# Patient Record
Sex: MALE | Race: WHITE | NOT HISPANIC OR LATINO | Employment: OTHER | URBAN - METROPOLITAN AREA
[De-identification: names, ages, dates, MRNs, and addresses within clinical notes are randomized per-mention and may not be internally consistent; named-entity substitution may affect disease eponyms.]

---

## 2020-03-31 PROBLEM — Z71.89 ADVANCED CARE PLANNING/COUNSELING DISCUSSION: Status: ACTIVE | Noted: 2020-03-31

## 2020-03-31 PROBLEM — I10 HYPERTENSION: Status: ACTIVE | Noted: 2020-03-31

## 2020-03-31 PROBLEM — E78.5 HYPERLIPIDEMIA: Status: ACTIVE | Noted: 2020-03-31

## 2020-03-31 PROBLEM — Z91.148 NONCOMPLIANCE WITH MEDICATIONS: Status: ACTIVE | Noted: 2020-03-31

## 2020-03-31 PROBLEM — R07.9 CHEST PAIN: Status: ACTIVE | Noted: 2020-03-31

## 2020-03-31 PROBLEM — I82.452 ACUTE DEEP VEIN THROMBOSIS (DVT) OF LEFT PERONEAL VEIN: Status: ACTIVE | Noted: 2020-03-31

## 2020-03-31 PROBLEM — Z90.5 HISTORY OF NEPHRECTOMY: Status: ACTIVE | Noted: 2020-03-31

## 2020-03-31 PROBLEM — I26.99 ACUTE PULMONARY EMBOLISM: Status: ACTIVE | Noted: 2020-03-31

## 2020-03-31 PROBLEM — Z85.528 HISTORY OF RENAL CELL CANCER: Status: ACTIVE | Noted: 2020-03-31

## 2020-04-01 ENCOUNTER — TELEPHONE (OUTPATIENT)
Dept: HEMATOLOGY/ONCOLOGY | Facility: CLINIC | Age: 85
End: 2020-04-01

## 2020-04-01 NOTE — TELEPHONE ENCOUNTER
Called to confirm appt.  Confirmed with wife.  ----- Message from Bella Messina LPN sent at 4/1/2020  3:31 PM CDT -----  Contact: pt's wife Jessica       ----- Message -----  From: Shyam Muñoz  Sent: 4/1/2020   2:12 PM CDT  To: Anika Lopez is calling to schedule a 1week Hospital Follow up(Blood Clot in lungs), pls call back and advise   Call Back#[phone]  Thanks

## 2020-04-01 NOTE — TELEPHONE ENCOUNTER
----- Message from Kishore Garg sent at 4/1/2020 11:46 AM CDT -----  [facility]  Patient need to schedule a hospital follow up appointment 5 days from today please call back at [phone] (home)     Case number [id number]

## 2020-04-06 ENCOUNTER — TELEPHONE (OUTPATIENT)
Dept: HEMATOLOGY/ONCOLOGY | Facility: CLINIC | Age: 85
End: 2020-04-06

## 2020-04-06 NOTE — TELEPHONE ENCOUNTER
Returned call to patient, granddaughter provider Mrs. Bee's cell number of [phone], no answer, no voice mail

## 2020-04-06 NOTE — TELEPHONE ENCOUNTER
----- Message from Tess Vang sent at 4/6/2020  2:57 PM CDT -----  Contact: patient  Type:  Patient Returning Call    Who Called:  patient  Who Left Message for Patient:  unsure  Does the patient know what this is regarding?:  no  Best Call Back Number:    Additional Information:  Please advise-thank you

## 2020-04-16 ENCOUNTER — OFFICE VISIT (OUTPATIENT)
Dept: HEMATOLOGY/ONCOLOGY | Facility: CLINIC | Age: 85
End: 2020-04-16
Payer: MEDICARE

## 2020-04-16 DIAGNOSIS — Z85.528 HISTORY OF KIDNEY CANCER: ICD-10-CM

## 2020-04-16 DIAGNOSIS — I82.452 ACUTE DEEP VEIN THROMBOSIS (DVT) OF LEFT PERONEAL VEIN: Primary | ICD-10-CM

## 2020-04-16 DIAGNOSIS — I26.99 PULMONARY EMBOLISM, UNSPECIFIED CHRONICITY, UNSPECIFIED PULMONARY EMBOLISM TYPE, UNSPECIFIED WHETHER ACUTE COR PULMONALE PRESENT: ICD-10-CM

## 2020-04-16 PROCEDURE — 99499 RISK ADDL DX/OHS AUDIT: ICD-10-PCS | Mod: 95,,, | Performed by: INTERNAL MEDICINE

## 2020-04-16 PROCEDURE — 99499 UNLISTED E&M SERVICE: CPT | Mod: 95,,, | Performed by: INTERNAL MEDICINE

## 2020-04-16 PROCEDURE — 99441 PR PHYSICIAN TELEPHONE EVALUATION 5-10 MIN: ICD-10-PCS | Mod: 95,,, | Performed by: INTERNAL MEDICINE

## 2020-04-16 PROCEDURE — 99441 PR PHYSICIAN TELEPHONE EVALUATION 5-10 MIN: CPT | Mod: 95,,, | Performed by: INTERNAL MEDICINE

## 2020-04-16 NOTE — PROGRESS NOTES
Chief complaint:  DVT/pulmonary embolus    History of present illness:  The patient is an 86-year-old, never smoker, who presents for consultation via telephone encounter due to ongoing difficulties with COVID 19 pandemic.  Patient became ill 2 weeks ago when he noted pain and swelling in the left lower extremity.  He was found to have peroneal and popliteal deep venous thrombosis in association with multiple pulmonary thromboemboli.  Patient was briefly hospitalized, started on Lovenox, and transitioned to Eliquis.  He is tolerating this medication well.  He reports diminution in left lower extremity pain and swelling.  Patient has not previously suffered thrombotic event and has no known family history of thrombophilia.  Patient denies any antecedent illness or medical procedures leading up to this event.  Patient does have a distant history of resected left renal cell carcinoma.  No other complaints for pertinent findings on a 14 point review of systems.    Past medical history:  1.  Status post left nephrectomy for RCC  2.  Hypertension  3.  Hyperlipidemia  4.  Frequent falls    Allergies:  None known    Medications:  1.  Norvasc 5 mg daily  2.  Eliquis 5 mg twice daily  3.  Ibuprofen 400 mg every 6 hr as needed for pain  4.  Linane topical as directed  5.  Prilosec 20 mg daily as needed for acid reflux  6.  Crestor 20 mg daily    Family/social history:  Never smoker.  No alcohol consumption.  No family history of thrombophilia.    Physical examination:  Deferred as this is a telephone consultation performed at ongoing difficulties with COVID 19 pandemic.    Laboratory:  Most recent CBCs from 03/31/2020.  White count 12.5, hemoglobin 15.2, hematocrit 45.4, platelets 126, absolute neutrophil count 7000.  Prothrombin time 12.5, INR 1.0, partial thromboplastin time 21.6.  Most recent chemistries are from 04/01/2020.  Sodium 135, potassium 4.1, chloride 107, CO2 23, BUN 17, creatinine 1.3, glucose 115, calcium 8.8,  GFR 49.    CT angiogram of the chest/abdomen/pelvis:  1. There are bilateral pulmonary thromboemboli demonstrated left more so than right.  No large central saddle type embolus is appreciated.  No straightening or reversal of the interventricular septum to suggest right strain is currently appreciated.  2. There is trace left pleural fluid present with some patchy subsegmental atelectasis or postinflammatory sequela adjacent and inferior segment of the lingula.  3. There is a moderate hiatal hernia with slight gastroesophageal fold thickening present.  4. There is diffuse hepatic steatosis present.  5. No aortic aneurysmal related change or dissection is appreciated.     Left lower extremity venous Doppler:  1. Occlusive thrombus in 1 of 2 peroneal veins in the left lower extremity.  2. Nonocclusive thrombus in the left popliteal vein.    Impression:  1.  Unprovoked left lower extremity deep venous thrombosis/pulmonary thromboemboli.  2.  Distant history resected left renal cell carcinoma without evidence of recurrence per most recent imaging.    Plan:  1.  Continue anticoagulation with Eliquis for no less than 6 months.  2.  Arrange for laboratory to include CBC, CMP, LDH, haptoglobin, and thrombophilia panel 3 months from now.  3.  Patient to return to clinic to review results of this analysis at earliest convenience.  4.  In excess of 1 hr was spent compiling this consult and counseling the patient regarding his abnormal findings, need of additional workup, plan of care etcetera.  He voices understanding wishes to proceed.    This note was created using voice recognition software and may contain grammatical errors.

## 2020-04-16 NOTE — LETTER
April 16, 2020        Bigg Armando MD  [address]  [facility] [address]             [facility] [place] [address] [facility] [address]  [facility] [address]  Phone: [phone]  Fax: [phone]   Patient: Jimena Bee   MR Number: [id number]   YOB: 1933   Date of Visit: 4/16/2020       Dear Dr. Armando:    Thank you for referring Jimena Bee to me for evaluation of LLE DVT/ PE. Below are the relevant portions of my assessment and plan of care.  If you have questions, please do not hesitate to call me. I look forward to following Jimena along with you.    Sincerely,      Kwaku Donaldson MD           CC  Luke Castro MD

## 2020-04-16 NOTE — Clinical Note
Return to clinic 3 months from now for lab only to include CBC, CMP, LDH, haptoglobin, and thrombophilia panel.  Please add additional send out labs as usual.Return to clinic to review results when completed.Patient to remain on Eliquis for no less than 6 months.

## 2020-04-16 NOTE — PROGRESS NOTES
Patient, Jimena Bee (MRN #[id number]), presented with a recent Platelet count less than 150 K/uL consistent with the definition of thrombocytopenia (ICD10 - D69.6).    Platelets   Date Value Ref Range Status   03/31/2020 126 (L) 150 - 350 K/uL Final     The patient's thrombocytopenia was monitored, evaluated, addressed and/or treated. This addendum to the medical record is made on 04/16/2020.

## 2020-04-18 PROBLEM — K85.90 ACUTE PANCREATITIS: Status: ACTIVE | Noted: 2020-04-18

## 2020-04-18 PROBLEM — D69.6 THROMBOCYTOPENIA: Status: ACTIVE | Noted: 2020-04-18

## 2020-04-18 PROBLEM — D69.6 THROMBOCYTOPENIA: Status: RESOLVED | Noted: 2020-04-18 | Resolved: 2020-04-18

## 2020-07-14 ENCOUNTER — TELEPHONE (OUTPATIENT)
Dept: HEMATOLOGY/ONCOLOGY | Facility: CLINIC | Age: 85
End: 2020-07-14

## 2020-07-14 NOTE — TELEPHONE ENCOUNTER
Called patient, spoke with wife regarding 3 month follow up with labs prior as scheduled back in April 2020. I explained that the consultation took place over the phone due to Covid 19 concerns on 4/16/20.  The recommendation by Dr. Donaldson was to return to clinic in 3 months with labs. Wife stated that patient sees Dr. Castro and Dr. Castro has been treating him and refilling his Eliquis. Wife stated she will discuss with patient and Dr. Castro and call us back if needed.

## 2021-01-10 ENCOUNTER — IMMUNIZATION (OUTPATIENT)
Dept: FAMILY MEDICINE | Facility: CLINIC | Age: 86
End: 2021-01-10
Payer: MEDICARE

## 2021-01-10 DIAGNOSIS — Z23 NEED FOR VACCINATION: ICD-10-CM

## 2021-01-10 PROCEDURE — 91300 COVID-19, MRNA, LNP-S, PF, 30 MCG/0.3 ML DOSE VACCINE: CPT | Mod: PBBFAC | Performed by: FAMILY MEDICINE

## 2021-01-31 ENCOUNTER — IMMUNIZATION (OUTPATIENT)
Dept: FAMILY MEDICINE | Facility: CLINIC | Age: 86
End: 2021-01-31
Payer: MEDICARE

## 2021-01-31 DIAGNOSIS — Z23 NEED FOR VACCINATION: Primary | ICD-10-CM

## 2021-01-31 PROCEDURE — 91300 COVID-19, MRNA, LNP-S, PF, 30 MCG/0.3 ML DOSE VACCINE: CPT | Mod: PBBFAC | Performed by: INTERNAL MEDICINE

## 2021-01-31 PROCEDURE — 0002A COVID-19, MRNA, LNP-S, PF, 30 MCG/0.3 ML DOSE VACCINE: CPT | Mod: PBBFAC | Performed by: INTERNAL MEDICINE

## 2021-10-09 ENCOUNTER — IMMUNIZATION (OUTPATIENT)
Dept: FAMILY MEDICINE | Facility: CLINIC | Age: 86
End: 2021-10-09
Payer: MEDICARE

## 2021-10-09 DIAGNOSIS — Z23 NEED FOR VACCINATION: Primary | ICD-10-CM

## 2021-10-09 PROCEDURE — 0003A COVID-19, MRNA, LNP-S, PF, 30 MCG/0.3 ML DOSE VACCINE: CPT | Mod: PBBFAC | Performed by: FAMILY MEDICINE

## 2021-10-09 PROCEDURE — 91300 COVID-19, MRNA, LNP-S, PF, 30 MCG/0.3 ML DOSE VACCINE: CPT | Mod: PBBFAC | Performed by: FAMILY MEDICINE

## 2023-12-04 PROBLEM — Z86.711 HISTORY OF PULMONARY EMBOLISM: Status: ACTIVE | Noted: 2023-12-04

## 2023-12-04 PROBLEM — G93.40 ENCEPHALOPATHY: Status: ACTIVE | Noted: 2023-12-04

## 2023-12-05 PROBLEM — G93.40 ACUTE ENCEPHALOPATHY: Status: ACTIVE | Noted: 2023-12-05

## 2023-12-05 PROBLEM — R29.818 ACUTE FOCAL NEUROLOGICAL DEFICIT: Status: ACTIVE | Noted: 2023-12-05

## 2023-12-05 PROBLEM — R29.818 ACUTE FOCAL NEUROLOGICAL DEFICIT: Status: RESOLVED | Noted: 2023-12-05 | Resolved: 2023-12-05

## 2023-12-05 PROBLEM — I10 HYPERTENSION: Status: RESOLVED | Noted: 2020-03-31 | Resolved: 2023-12-05

## 2023-12-05 PROBLEM — R11.10 VOMITING: Status: RESOLVED | Noted: 2023-12-05 | Resolved: 2023-12-05

## 2023-12-05 PROBLEM — R10.84 GENERALIZED ABDOMINAL PAIN: Status: RESOLVED | Noted: 2023-12-05 | Resolved: 2023-12-05

## 2023-12-05 PROBLEM — R11.10 VOMITING: Status: ACTIVE | Noted: 2023-12-05

## 2023-12-05 PROBLEM — I63.512 ACUTE ISCHEMIC LEFT MCA STROKE: Status: ACTIVE | Noted: 2023-12-05

## 2023-12-05 PROBLEM — Z86.711 HISTORY OF PULMONARY EMBOLISM: Status: RESOLVED | Noted: 2023-12-04 | Resolved: 2023-12-05

## 2023-12-05 PROBLEM — G93.40 ENCEPHALOPATHY: Status: RESOLVED | Noted: 2023-12-04 | Resolved: 2023-12-05

## 2023-12-05 PROBLEM — E78.5 HLD (HYPERLIPIDEMIA): Status: RESOLVED | Noted: 2020-03-31 | Resolved: 2023-12-05

## 2023-12-05 PROBLEM — R10.84 GENERALIZED ABDOMINAL PAIN: Status: ACTIVE | Noted: 2023-12-05

## 2023-12-05 PROBLEM — G45.9 TIA (TRANSIENT ISCHEMIC ATTACK): Status: ACTIVE | Noted: 2023-12-05

## 2023-12-05 PROBLEM — G45.9 TIA (TRANSIENT ISCHEMIC ATTACK): Status: RESOLVED | Noted: 2023-12-05 | Resolved: 2023-12-05

## 2023-12-06 ENCOUNTER — TELEPHONE (OUTPATIENT)
Dept: NEUROLOGY | Facility: CLINIC | Age: 88
End: 2023-12-06
Payer: MEDICARE

## 2023-12-06 NOTE — TELEPHONE ENCOUNTER
Called the pt to inform of appt with Jimena Mcginnis NP., unable to reach the pt or leave a message. No other contacts are listed. Appt letter placed in mail

## 2023-12-06 NOTE — TELEPHONE ENCOUNTER
----- Message from Alivia Mclain RN sent at 12/5/2023 12:23 PM CST -----  Regarding: follow up appointment  Please scheduled patient for a follow up appointment with Jimena Mcginnis NP , for 2 weeks from now.  Thank you

## 2024-01-09 ENCOUNTER — TELEPHONE (OUTPATIENT)
Dept: NEUROLOGY | Facility: CLINIC | Age: 89
End: 2024-01-09
Payer: MEDICARE

## 2024-03-06 PROBLEM — I63.512 ACUTE ISCHEMIC LEFT MCA STROKE: Status: RESOLVED | Noted: 2023-12-05 | Resolved: 2024-03-06

## 2024-03-06 PROBLEM — R07.89 CHEST PRESSURE: Status: ACTIVE | Noted: 2024-03-06

## 2024-03-06 PROBLEM — R79.89 ELEVATED TROPONIN: Status: ACTIVE | Noted: 2024-03-06

## 2024-03-06 PROBLEM — I48.19 ATRIAL FIBRILLATION, PERSISTENT: Status: RESOLVED | Noted: 2024-03-06 | Resolved: 2024-03-06

## 2024-03-06 PROBLEM — K85.90 ACUTE PANCREATITIS: Status: RESOLVED | Noted: 2020-04-18 | Resolved: 2024-03-06

## 2024-03-06 PROBLEM — K44.9 ESOPHAGEAL HIATUS HERNIA: Status: ACTIVE | Noted: 2024-03-06

## 2024-03-06 PROBLEM — Z90.5 HISTORY OF NEPHRECTOMY: Status: RESOLVED | Noted: 2020-03-31 | Resolved: 2024-03-06

## 2024-03-06 PROBLEM — I48.19 ATRIAL FIBRILLATION, PERSISTENT: Status: ACTIVE | Noted: 2024-03-06

## 2024-03-06 PROBLEM — Z85.528 HISTORY OF KIDNEY CANCER: Status: RESOLVED | Noted: 2020-04-16 | Resolved: 2024-03-06

## 2024-03-06 PROBLEM — I25.10 CORONARY ARTERY DISEASE INVOLVING NATIVE CORONARY ARTERY: Status: ACTIVE | Noted: 2024-03-06

## 2024-03-12 PROBLEM — I21.4 NSTEMI (NON-ST ELEVATED MYOCARDIAL INFARCTION): Status: ACTIVE | Noted: 2024-03-12

## 2024-03-14 PROBLEM — R79.89 ELEVATED TROPONIN: Status: RESOLVED | Noted: 2024-03-14 | Resolved: 2024-03-14

## 2024-03-14 PROBLEM — R79.89 ELEVATED TROPONIN: Status: ACTIVE | Noted: 2024-03-14

## 2024-03-14 PROBLEM — I10 UNCONTROLLED HYPERTENSION: Status: RESOLVED | Noted: 2024-03-14 | Resolved: 2024-03-14

## 2024-03-14 PROBLEM — R07.9 CHEST PAIN: Status: RESOLVED | Noted: 2024-03-14 | Resolved: 2024-03-14

## 2024-03-14 PROBLEM — I21.4 NSTEMI (NON-ST ELEVATED MYOCARDIAL INFARCTION): Status: RESOLVED | Noted: 2024-03-12 | Resolved: 2024-03-14

## 2024-03-14 PROBLEM — R07.9 CHEST PAIN: Status: ACTIVE | Noted: 2024-03-14

## 2024-03-14 PROBLEM — I10 UNCONTROLLED HYPERTENSION: Status: ACTIVE | Noted: 2024-03-14

## 2024-07-22 ENCOUNTER — TELEPHONE (OUTPATIENT)
Dept: OTOLARYNGOLOGY | Facility: CLINIC | Age: 89
End: 2024-07-22
Payer: MEDICARE

## 2024-07-22 NOTE — TELEPHONE ENCOUNTER
S/w wife and she is requesting a sooner appt than 9/10. She states pt has an excessive wax build up and needs ears cleaned so pt can hear. Appt made for 7/24 at 930, wife confirmed.

## 2024-07-22 NOTE — TELEPHONE ENCOUNTER
----- Message from Swathi Schmid sent at 7/22/2024 10:52 AM CDT -----  Contact: Arnold Mao  Type:  Sooner Appointment Request    Caller is requesting a sooner appointment.  Caller declined first available appointment listed below.  Caller will not accept being placed on the waitlist and is requesting a message be sent to doctor.    Name of Caller:  Patients Arnold Mao  When is the first available appointment?  09/10-scheduled  Symptoms:  Impacted ear wax B/L- woke up this morning and can't hear at all  Would the patient rather a call back or a response via [facility]? Call  Best Call Back Number:  [phone]  Additional Information:  Please call pt back on home number since he can't hear, she is wanting to see if there was anyway we can see him in the next few days since it seems to be getting worse. Thank You.

## 2024-07-24 ENCOUNTER — OFFICE VISIT (OUTPATIENT)
Dept: OTOLARYNGOLOGY | Facility: CLINIC | Age: 89
End: 2024-07-24
Payer: MEDICARE

## 2024-07-24 VITALS — HEIGHT: 70 IN | WEIGHT: 178.38 LBS | BODY MASS INDEX: 25.54 KG/M2

## 2024-07-24 DIAGNOSIS — H61.23 BILATERAL IMPACTED CERUMEN: ICD-10-CM

## 2024-07-24 DIAGNOSIS — H91.93 BILATERAL HEARING LOSS, UNSPECIFIED HEARING LOSS TYPE: Primary | ICD-10-CM

## 2024-07-24 PROCEDURE — 3288F FALL RISK ASSESSMENT DOCD: CPT | Mod: CPTII,S$GLB,, | Performed by: NURSE PRACTITIONER

## 2024-07-24 PROCEDURE — 69210 REMOVE IMPACTED EAR WAX UNI: CPT | Mod: S$GLB,,, | Performed by: NURSE PRACTITIONER

## 2024-07-24 PROCEDURE — 1100F PTFALLS ASSESS-DOCD GE2>/YR: CPT | Mod: CPTII,S$GLB,, | Performed by: NURSE PRACTITIONER

## 2024-07-24 PROCEDURE — 1160F RVW MEDS BY RX/DR IN RCRD: CPT | Mod: CPTII,S$GLB,, | Performed by: NURSE PRACTITIONER

## 2024-07-24 PROCEDURE — 99203 OFFICE O/P NEW LOW 30 MIN: CPT | Mod: 25,S$GLB,, | Performed by: NURSE PRACTITIONER

## 2024-07-24 PROCEDURE — 1126F AMNT PAIN NOTED NONE PRSNT: CPT | Mod: CPTII,S$GLB,, | Performed by: NURSE PRACTITIONER

## 2024-07-24 PROCEDURE — 1159F MED LIST DOCD IN RCRD: CPT | Mod: CPTII,S$GLB,, | Performed by: NURSE PRACTITIONER

## 2024-07-24 PROCEDURE — 99999 PR PBB SHADOW E&M-EST. PATIENT-LVL III: CPT | Mod: PBBFAC,,, | Performed by: NURSE PRACTITIONER

## 2024-07-24 NOTE — PROGRESS NOTES
Subjective     Patient ID: Jimena Bee is a 89 y.o. male.    Chief Complaint: Cerumen Impaction (Causing hearing loss)    HPI  Patient last saw me 11 years ago for hearing loss and ear cleaning. Patient is new, self-referred for ears. Patient states ears clogged affecting his hearing X few weeks.     Review of Systems   Constitutional: Negative.    HENT: Negative.     Eyes: Negative.    Respiratory: Negative.     Cardiovascular: Negative.    Gastrointestinal: Negative.    Musculoskeletal: Negative.    Integumentary:  Negative.   Neurological: Negative.    Hematological: Negative.    Psychiatric/Behavioral: Negative.          Objective     Physical Exam  Vitals and nursing note reviewed.   Constitutional:       General: He is not in acute distress.     Appearance: He is well-developed. He is not ill-appearing.   HENT:      Head: Normocephalic and atraumatic.      Right Ear: Hearing, tympanic membrane, ear canal and external ear normal. No middle ear effusion. Tympanic membrane is not erythematous.      Left Ear: Hearing, tympanic membrane, ear canal and external ear normal.  No middle ear effusion. Tympanic membrane is not erythematous.      Nose: Nose normal.   Eyes:      General: Lids are normal. No scleral icterus.        Right eye: No discharge.         Left eye: No discharge.   Neck:      Trachea: Trachea normal. No tracheal deviation.   Cardiovascular:      Rate and Rhythm: Normal rate.   Pulmonary:      Effort: Pulmonary effort is normal. No respiratory distress.      Breath sounds: No stridor. No wheezing.   Musculoskeletal:         General: Normal range of motion.      Cervical back: Normal range of motion.   Skin:     General: Skin is warm and dry.   Neurological:      Mental Status: He is alert and oriented to person, place, and time.      Coordination: Coordination is intact.      Gait: Gait normal.   Psychiatric:         Attention and Perception: Attention normal.         Mood and Affect: Mood  normal.         Speech: Speech normal.         Behavior: Behavior normal. Behavior is cooperative.     SEPARATE PROCEDURE IN OFFICE:   Procedure: Removal of impacted cerumen, bilateral   Pre Procedure Diagnosis: Cerumen Impaction   Post Procedure Diagnosis: Cerumen Impaction   Verbal informed consent in regards to risk of trauma to ear canal, ear drum or hearing, discomfort during procedure and/or inability to remove cerumen impaction in one session or unforeseen events or complications.   No anesthesia.     Procedure in detail:   Ear canal visualized bilateral with appropriate size ear speculum utilizing Operating Head Binocular Otomicroscope   Utilizing the following:  Suction cannula was used. The impacted cerumen of the ear canals was removed atraumatically. The TM and EAC were then inspected and found to be clear of wax. See description of TMs/EACs in PE above.   Complications: No   Condition: Improved/Good       Assessment and Plan     1. Bilateral hearing loss, unspecified hearing loss type    2. Bilateral impacted cerumen      Offered audiogram which patient politely declined. Patient agrees to call back if he desires to proceed. Patient encouraged to return to clinic if symptoms worsen/persist and as needed for further ENT symptoms or concerns.          No follow-ups on file.